# Patient Record
Sex: FEMALE | Race: WHITE
[De-identification: names, ages, dates, MRNs, and addresses within clinical notes are randomized per-mention and may not be internally consistent; named-entity substitution may affect disease eponyms.]

---

## 2017-05-15 NOTE — PHYS DOC
Past Medical History


Past Medical History:  No Pertinent History, Anxiety


Past Surgical History:  


Alcohol Use:  None


Drug Use:  None





Adult General


Chief Complaint


Chief Complaint:  HAND PROBLEM





HPI


HPI





Patient is a 32  year old female who presents here today secondary to pain to 

her left hand. Patient reports that it was actually hit her son while he was 

swinging a plastic bat. Patient claims of pain over her fifth MCP joint region. 

Patient with any other complaints at this time.





Patient's physical exam is significant for tenderness to palpation to her left 

fifth MCP area. Patient has a deformity. There is some soft tissue swelling in 

that area and some erythema. Patient's full range of motion intact. Patient has 

no deformity





Patient's ER workup was significant for an x-ray that was obtained that 

revealed no acute fracture.





X-ray left hand: No fracture no dislocation interpreted by Dr. Virgen.





Assessment and plan


This is a 32-year-old female who presents here today secondary to pain to her 

left hand after blunt trauma to it. Patient's x-rays are negative. Patient be 

discharged home with ibuprofen assist her with the pain.





Review of Systems


Review of Systems





Constitutional: Denies fever or chills []


Eyes: Denies change in visual acuity, redness, or eye pain []


All other review systems are negative except as documented in the history of 

present illness portion.





Current Medications


Current Medications





Current Medications








 Medications


  (Trade)  Dose


 Ordered  Sig/Luis Angel  Start Time


 Stop Time Status Last Admin


Dose Admin


 


 Ibuprofen


  (Motrin)  600 mg  1X  ONCE  5/15/17 23:30


 5/15/17 23:31   


 











Allergies


Allergies





Allergies








Coded Allergies Type Severity Reaction Last Updated Verified


 


  No Known Drug Allergies    7/22/15 No











Physical Exam


Physical Exam





Constitutional: Well developed, well nourished, no acute distress, non-toxic 

appearance. []


HENT: Normocephalic, atraumatic, bilateral external ears normal, oropharynx 

moist, no oral exudates, nose normal. []


Eyes: PERRLA, EOMI, conjunctiva normal, no discharge. [] 


Neck: Normal range of motion, no tenderness, supple, no stridor. [] 


Cardiovascular:Heart rate regular rhythm, no murmur []


Lungs & Thorax:  Bilateral breath sounds clear to auscultation []


Abdomen: Bowel sounds normal, soft, no tenderness, no masses, no pulsatile 

masses. [] 


Skin: Warm, dry, no erythema, no rash. [] 


Back: No tenderness, no CVA tenderness. [] 


Extremities: See above.


Neurologic: Alert and oriented X 3, normal motor function, normal sensory 

function, no focal deficits noted. []


Psychologic: Affect normal, judgement normal, mood normal. []





Current Patient Data


Vital Signs





 Vital Signs








  Date Time  Temp Pulse Resp B/P (MAP) Pulse Ox O2 Delivery O2 Flow Rate FiO2


 


5/15/17 22:52 98.0 86 18  98 Room Air  





 98.0       











EKG


EKG


[]





Radiology/Procedures


Radiology/Procedures


[]





Course & Med Decision Making


Course & Med Decision Making


Pertinent Labs and Imaging studies reviewed. (See chart for details)





[]





Dragon Disclaimer


Dragon Disclaimer


This electronic medical record was generated, in whole or in part, using a 

voice recognition dictation system.





Departure


Departure


Impression:  


 Primary Impression:  


 Contusion of left hand, initial encounter


Disposition:   HOME, SELF-CARE


Condition:  IMPROVED


Referrals:  


UNKNOWN PCP NAME (PCP)


Patient Instructions:  Hand Contusion


Scripts


Ibuprofen (IBUPROFEN) 600 Mg Tablet


600 MG PO PRN Q6HRS Y for PAIN, #20 TAB


   Prov: ALICE RESENDIZ MD         5/15/17











ALICE RESENDIZ MD May 15, 2017 23:21

## 2017-05-16 NOTE — RAD
Portable left hand, 3 views, 5/15/2017:



History: Hand injury. 



No fracture or dislocation is identified. The soft tissues are unremarkable.





IMPRESSION: No acute left hand abnormality is detected.

## 2017-07-03 NOTE — PHYS DOC
Past Medical History


Past Medical History:  No Pertinent History, Anxiety


Past Surgical History:  


Alcohol Use:  None


Drug Use:  None





Adult General


Chief Complaint


Chief Complaint:  KNEE INJURY





HPI


HPI





Patient is a 32  year old female with history of anxiety who presents with 

right posterior knee pain mild in nature that began yesterday while mowing his 

lawn. Patient denies any known injury. Patient states the pain is worse on 

flexion of the knee.





Review of Systems


Review of Systems





Constitutional: Denies fever or chills []


Eyes: Denies change in visual acuity, redness, or eye pain []


Musculoskeletal: right posterior knee pain


Integument: Denies rash or skin lesions []


Neurologic: Denies headache, focal weakness or sensory changes []


Endocrine: Denies polyuria or polydipsia []





Allergies


Allergies





Allergies








Coded Allergies Type Severity Reaction Last Updated Verified


 


  No Known Drug Allergies    7/22/15 No











Physical Exam


Physical Exam





Constitutional: Well developed, well nourished, no acute distress, non-toxic 

appearance. []


HENT: Normocephalic, atraumatic, bilateral external ears normal, oropharynx 

moist, no oral exudates, nose normal. []


Skin: Warm, dry, no erythema, no rash. [] 


Back: No tenderness, no CVA tenderness. [] 


Extremities: Right knee with no obvious edema and obvious ecchymosis no obvious 

deformity. Slight tenderness on palpation of posterior right knee. Full range 

of motion to the right knee including negative Lachman sign and negative 

Sandra's sign negative anterior-posterior drawer sign to the right knee. +2 

right pedal pulse. Cap refill less than 2 seconds the right lower extremity. 

Sensation intact to the right lower extremity.


Neurologic: Alert and oriented X 3, normal motor function, normal sensory 

function, no focal deficits noted. []


Psychologic: Affect normal, judgement normal, mood normal. []





Current Patient Data


Vital Signs





 Vital Signs








  Date Time  Temp Pulse Resp B/P (MAP) Pulse Ox O2 Delivery O2 Flow Rate FiO2


 


7/3/17 15:28 98.4 75 18  98 Room Air  





 98.4       











EKG


EKG


[]





Radiology/Procedures


Radiology/Procedures


[]PROCEDURE: KNEE RIGHT 4V





Indication injury, pain.





AP oblique and lateral views of the right knee were obtained. A sunrise view


was also obtained.





No acute finding is seen. Significant degenerative changes are not apparent














DICTATED and SIGNED BY:     EDWINA CARLSON MD


DATE:     17 5008





CC: DELANEY RENNER; NON,STAFF; UNKNOWN PCP NAME ~





Course & Med Decision Making


Course & Med Decision Making


Pertinent Labs and Imaging studies reviewed. (See chart for details)





Patient is in the ED right knee pain that began while mowing yesterday. Right 

knee x-rays interpreted by radiologist are negative for any acute findings. Ace 

wrap was applied to patient's right knee by the ED RN, neurovascular exam done 

by me is normal, cap refill less than 2 seconds. Ice elevation encouraged. 

Discharged with diclofenac. Follow-up with orthopedic doctor in a week if pain 

continues.





Dragon Disclaimer


Dragon Disclaimer


This electronic medical record was generated, in whole or in part, using a 

voice recognition dictation system.





Departure


Departure


Impression:  


 Primary Impression:  


 Knee sprain


Disposition:   HOME, SELF-CARE


Condition:  STABLE


Referrals:  


UNKNOWN PCP NAME (PCP)








AVILA MARTINEZ II, MD


Follow-up with the provided orthopedic doctor in the next 7 days


Patient Instructions:  Knee Pain, Knee Sprain





Additional Instructions:  


You were seen for right knee sprain. Wear the Ace wrap as tolerated and needed. 

Ice and elevate the extremity. Follow-up with the provided orthopedic doctor or 

your own doctor in 1-2 weeks if pain continues. Take the prescribed medicines 

as needed.


Scripts


Diclofenac Sodium (DICLOFENAC SODIUM) 50 Mg Tablet.


1 TAB PO BID, #60 TAB 1 Refill


   Prov: DELANEY RENNER         7/3/17





Problem Qualifiers








 Primary Impression:  


 Knee sprain


 Encounter type:  initial encounter  Involved ligament of knee:  unspecified 

ligament  Laterality:  right  Qualified Codes:  S83.91XA - Sprain of 

unspecified site of right knee, initial encounter








DELANEY RENNER Jul 3, 2017 16:24

## 2017-07-03 NOTE — RAD
Indication injury, pain.



AP oblique and lateral views of the right knee were obtained. A sunrise view

was also obtained.



No acute finding is seen. Significant degenerative changes are not apparent

## 2017-09-07 NOTE — RAD
Indication abdominal pain.



Axial images through the abdomen and pelvis were obtained. Approximately 75 cc

of Omnipaque 300 was administered intravenously. No oral contrast was

administered. No prior CT imaging of the abdomen or pelvis is available.



The lung bases are clear. The liver and spleen appear unremarkable. The

gallbladder appears grossly normal. The pancreas adrenal glands and kidneys

appear normal. No acute finding is apparent in the abdomen. In the pelvis no

focal mass or inflammatory process is seen. IUD is noted.



IMPRESSION: No acute finding seen in the abdomen or pelvis

## 2017-09-07 NOTE — PHYS DOC
Past Medical History


Past Medical History:  No Pertinent History, Anxiety


Past Surgical History:  


Alcohol Use:  None


Drug Use:  None





Adult General


Chief Complaint


Chief Complaint:  ABDOMINAL PAIN





HPI


HPI


32-year-old female presenting to the emergency department today with epigastric 

abdominal pain nausea with vomiting. She describes her vomitus is stomach 

contents. She denies any recent food intake and denies any other sick contacts. 

She also has had loose stools without any blood in her stools. She denies 

fevers or chills. She describes the pain is mild nonradiating intermittent and 

without alleviating factors.





Review of systems is negative for chest pain shortness of breath hematuria 

polyuria dysuria or being pregnant. All other review of systems is negative 

unless otherwise noted in history of present illness.





ED course: 32-year-old female presenting to the emergency Department 

generalized epigastric abdominal pain. Vital signs show the patient to be 

tachycardic otherwise afebrile. Pertinent physical examination findings:Soft 

nontender abdomen without rebound tenderness or guarding present. Negative 

McBurneys point. Negative Connolly sign. No ecchymosis present. IV fluids and 

Zofran given in the emergency department. CT the abdomen pelvis obtained. 

Repeat abdominal exam continues show soft and nontender abdomen. She was 

feeling much better on reexamination.  CT abd pelv neg. The patient was then 

discharged home in stable condition to follow up with their primary care 

physician over the next 2-3 days. They were to return if their symptoms 

worsened or if they were concerned for any reason. Face-to-face discharge 

instructions and return precautions were given. Patient's questions were 

answered to their satisfaction. Patient is comfortable plan.





Review of Systems


Review of Systems


SEE ABOVE.





Current Medications


Current Medications





Current Medications








 Medications


  (Trade)  Dose


 Ordered  Sig/Luis Angel  Start Time


 Stop Time Status Last Admin


Dose Admin


 


 Info


  (Do NOT chart on


 this entry -- for


 MONITORING)  1 each  PRN DAILY  PRN  17 09:45


 17 12:23 DC  


 


 


 Iohexol


  (Omnipaque 300


 Mg/ml)  75 ml  STK-MED ONCE  17 09:48


 17 09:49 DC  


 


 


 Ondansetron HCl


  (Zofran)  4 mg  1X  ONCE  17 09:00


 17 09:01 DC 17 09:09


4 MG


 


 Sodium Chloride  1,000 ml @ 


 1,000 mls/hr  1X  ONCE  17 09:30


 17 10:29 DC 17 10:10


1,000 MLS/HR











Allergies


Allergies





Allergies








Coded Allergies Type Severity Reaction Last Updated Verified


 


  No Known Drug Allergies    7/22/15 No











Physical Exam


Physical Exam


SEE ABOVE





Constitutional: Well developed, well nourished, no acute distress, non-toxic 

appearance. []


HENT: Normocephalic, atraumatic, bilateral external ears normal, oropharynx 

moist, no oral exudates, nose normal. []


Eyes: PERRLA, EOMI, conjunctiva normal, no discharge. [] 


Neck: Normal range of motion, no tenderness, supple, no stridor. [] 


Cardiovascular:Heart rate regular rhythm, no murmur []


Lungs & Thorax:  Bilateral breath sounds clear to auscultation []


Abdomen: Bowel sounds normal, soft, no tenderness, no masses, no pulsatile 

masses. [] 


Skin: Warm, dry, no erythema, no rash. [] 


Back: No tenderness, no CVA tenderness. [] 


Extremities: No tenderness, no cyanosis, no clubbing, ROM intact, no edema. [] 


Neurologic: Alert and oriented X 3, normal motor function, normal sensory 

function, no focal deficits noted. []


Psychologic: Affect normal, judgement normal, mood normal. []





Current Patient Data


Vital Signs





 Vital Signs








  Date Time  Temp Pulse Resp B/P (MAP) Pulse Ox O2 Delivery O2 Flow Rate FiO2


 


17 12:01  118 19 121/79 (93) 99 Room Air  


 


17 08:33 98.1       





 98.1       








Lab Values





 Laboratory Tests








Test


  17


07:28 17


08:19 17


09:00


 


POC Urine HCG, Qualitative


  Hcg negative


(Negative) 


  


 


 


Urine Collection Type  Unknown   


 


Urine Color  Yellow   


 


Urine Clarity  Clear   


 


Urine pH  6.5   


 


Urine Specific Gravity  >=1.030   


 


Urine Protein


  


  Negative mg/dL


(NEG-TRACE) 


 


 


Urine Glucose (UA)


  


  Negative mg/dL


(NEG) 


 


 


Urine Ketones (Stick)


  


  Negative mg/dL


(NEG) 


 


 


Urine Blood


  


  Negative (NEG)


  


 


 


Urine Nitrite


  


  Negative (NEG)


  


 


 


Urine Bilirubin


  


  Negative (NEG)


  


 


 


Urine Urobilinogen Dipstick


  


  1.0 mg/dL (0.2


mg/dL) 


 


 


Urine Leukocyte Esterase


  


  Negative (NEG)


  


 


 


Urine RBC  0 /HPF (0-2)   


 


Urine WBC


  


  Occ /HPF (0-4)


  


 


 


Urine Squamous Epithelial


Cells 


  Mod /LPF  


  


 


 


Urine Bacteria


  


  Few /HPF


(0-FEW) 


 


 


Urine Mucus  Mod /LPF   


 


White Blood Count


  


  


  14.9 x10^3/uL


(4.0-11.0)  H


 


Red Blood Count


  


  


  5.11 x10^6/uL


(3.50-5.40)


 


Hemoglobin


  


  


  15.1 g/dL


(12.0-15.5)


 


Hematocrit


  


  


  44.3 %


(36.0-47.0)


 


Mean Corpuscular Volume


  


  


  87 fL ()


 


 


Mean Corpuscular Hemoglobin   30 pg (25-35)  


 


Mean Corpuscular Hemoglobin


Concent 


  


  34 g/dL


(31-37)


 


Red Cell Distribution Width


  


  


  13.2 %


(11.5-14.5)


 


Platelet Count


  


  


  321 x10^3/uL


(140-400)


 


Neutrophils (%) (Auto)   94 % (31-73)  H


 


Lymphocytes (%) (Auto)   3 % (24-48)  L


 


Monocytes (%) (Auto)   3 % (0-9)  


 


Eosinophils (%) (Auto)   0 % (0-3)  


 


Basophils (%) (Auto)   0 % (0-3)  


 


Neutrophils # (Auto)


  


  


  14.0 x10^3uL


(1.8-7.7)  H


 


Lymphocytes # (Auto)


  


  


  0.5 x10^3/uL


(1.0-4.8)  L


 


Monocytes # (Auto)


  


  


  0.4 x10^3/uL


(0.0-1.1)


 


Eosinophils # (Auto)


  


  


  0.0 x10^3/uL


(0.0-0.7)


 


Basophils # (Auto)


  


  


  0.0 x10^3/uL


(0.0-0.2)


 


Segmented Neutrophils %   94 % (35-66)  H


 


Lymphocytes %   2 % (24-48)  L


 


Monocytes %   4 % (0-10)  


 


Platelet Estimate


  


  


  Adequate


(ADEQUATE)


 


Sodium Level


  


  


  140 mmol/L


(136-145)


 


Potassium Level


  


  


  3.6 mmol/L


(3.5-5.1)


 


Chloride Level


  


  


  100 mmol/L


()


 


Carbon Dioxide Level


  


  


  28 mmol/L


(21-32)


 


Anion Gap   12 (6-14)  


 


Blood Urea Nitrogen


  


  


  19 mg/dL


(7-20)


 


Creatinine


  


  


  0.7 mg/dL


(0.6-1.0)


 


Estimated GFR


(Cockcroft-Gault) 


  


  97.6  


 


 


BUN/Creatinine Ratio   27 (6-20)  H


 


Glucose Level


  


  


  116 mg/dL


(70-99)  H


 


Calcium Level


  


  


  9.4 mg/dL


(8.5-10.1)


 


Total Bilirubin


  


  


  0.3 mg/dL


(0.2-1.0)


 


Aspartate Amino Transferase


(AST) 


  


  16 U/L (15-37)


 


 


Alanine Aminotransferase (ALT)


  


  


  25 U/L (14-59)


 


 


Alkaline Phosphatase


  


  


  63 U/L


()


 


Total Protein


  


  


  8.5 g/dL


(6.4-8.2)  H


 


Albumin


  


  


  4.5 g/dL


(3.4-5.0)


 


Albumin/Globulin Ratio   1.1 (1.0-1.7)  


 


Lipase


  


  


  91 U/L


()





 Laboratory Tests


17 09:00








 Laboratory Tests


17 09:00














EKG


EKG


[]





Radiology/Procedures


Radiology/Procedures


[]





Course & Med Decision Making


Course & Med Decision Making


Pertinent Labs and Imaging studies reviewed. (See chart for details)





[]





Dragon Disclaimer


Dragon Disclaimer


This electronic medical record was generated, in whole or in part, using a 

voice recognition dictation system.





Departure


Departure


Impression:  


 Primary Impression:  


 Abdominal pain


 Additional Impression:  


 Nausea vomiting and diarrhea


Disposition:   HOME, SELF-CARE


Condition:  IMPROVED


Referrals:  


UNKNOWN PCP NAME (PCP)








PILI MAHAJAN MD


Patient Instructions:  Nausea and Vomiting





Additional Instructions:  


Thank you for allowing us to participate in your care today.





Followup with your primary care physician in 3 days if your symptoms do not 

improve.  Call your Primary Doctor tomorrow and inform them of your visit 

today.  If you do not have a primary care provider you can ask for a list of 

our primary care providers. Return to the emergency department you have any new 

or concerning findings.





This should be evaluated by the primary care physician and any necessary 

consulting services for continued management within a few days after discharge. 

Return to emergency room if you have any  new or concerning symptoms including 

but not limited to fever, chills, nausea, vomiting, intractable pain, any new 

rashes, chest pain, shortness of air, uncontrolled bleeding, difficulty 

breathing, and/or vision loss.


Scripts


Ondansetron (ZOFRAN ODT) 4 Mg Tab.rapdis


1 TAB SL PRN Q8HRS Y for NAUSEA, #6 TAB


   Prov: AP GOLD MD         17





Problem Qualifiers











AP GOLD MD Sep 7, 2017 08:58

## 2017-10-12 NOTE — PHYS DOC
Past Medical History


Past Medical History:  No Pertinent History, Anxiety


Past Surgical History:  


Alcohol Use:  None


Drug Use:  None





Adult General


Chief Complaint


Chief Complaint:  OTHER COMPLAINTS





[facility]


HPI





Patient is a 32  year old female presents to the emergency department with c/o 

pain to the left heel patient states this has been going on for last 3-4 days. 

Patient states she has swelling and discomfort. Patient denies injury or 

trauma. Denies pain relief with Tylenol. Patient states she tried insoles to 

see if this would help with the pain and actually made it worse.





Review of Systems


Review of Systems





Constitutional: Denies fever or chills []


Eyes: Denies change in visual acuity, redness, or eye pain []


HENT: Denies nasal congestion or sore throat []


Respiratory: Denies cough or shortness of breath []


Cardiovascular: No additional information not addressed in HPI []


GI: Denies abdominal pain, nausea, vomiting, bloody stools or diarrhea []


: Denies dysuria or hematuria []


Musculoskeletal: Denies back pain or joint pain. Left heel pain


Integument: Denies rash or skin lesions []


Neurologic: Denies headache, focal weakness or sensory changes []


Endocrine: Denies polyuria or polydipsia []





Current Medications


Current Medications





Current Medications








 Medications


  (Trade)  Dose


 Ordered  Sig/Luis Angel  Start Time


 Stop Time Status Last Admin


Dose Admin


 


 Ibuprofen


  (Motrin)  800 mg  1X  ONCE  10/12/17 08:45


 10/12/17 08:46 DC 10/12/17 08:47


800 MG











Allergies


Allergies





Allergies








Coded Allergies Type Severity Reaction Last Updated Verified


 


  No Known Drug Allergies    7/22/15 No











Physical Exam


Physical Exam





Constitutional: Well developed, well nourished, no acute distress, non-toxic 

appearance. []


HENT: Normocephalic, atraumatic, bilateral external ears normal, oropharynx 

moist, no oral exudates, nose normal. []


Eyes: PERRLA, EOMI, conjunctiva normal, no discharge. [] 


Neck: Normal range of motion, no tenderness, supple, no stridor. [] 


Cardiovascular:Heart rate regular rhythm, no murmur []


Lungs & Thorax:  Bilateral breath sounds clear to auscultation []


Abdomen: Bowel sounds normal, soft, no tenderness, no masses, no pulsatile 

masses. [] 


Skin: Warm, dry, no erythema, no rash. [] 


Extremities: Left heel tenderness and swelling, no cyanosis, no clubbing, ROM 

intact, no edema. no redness noted. Patient with full ROM of the foot. 

Peripheral pulses 2+ cap refill brisk < 2 seconds.


Neurologic: Alert and oriented X 3, normal motor function, normal sensory 

function, no focal deficits noted. []


Psychologic: Affect normal, judgement normal, mood normal. []





Current Patient Data


Vital Signs





 Vital Signs








  Date Time  Temp Pulse Resp B/P (MAP) Pulse Ox O2 Delivery O2 Flow Rate FiO2


 


10/12/17 08:28 98.5 106 16  98 Room Air  





 98.5       











EKG


EKG


[]





Radiology/Procedures


Radiology/Procedures


[][facility]


 [address] [place] [address]


 [phone]


 


 IMAGING REPORT





 Signed





PATIENT: REJI BAKER ACCOUNT: [id number] MRN#: [id number]


: 1985 LOCATION: ER AGE: 32


SEX: F EXAM DT: 10/12/17 ACCESSION#: [id number]


STATUS: REG ER ORD. PHYSICIAN: ERICA MEDRANO 


REASON: heel pain for 3-4 days increase pain with ambulation


PROCEDURE: FOOT LEFT 3V





Left foot, 3 views, 10/12/2017:





History: Heel pain





No acute fracture or dislocation is identified. There is smooth cortical


thickening along the shaft of the fourth metatarsal compatible with an old


healed fracture. There is a small sclerotic focus in the proximal end of the


proximal phalanx of the third toe compatible with a bone island. There is a


similar small sclerotic focus in the cuboid bone laterally. A small inferior


calcaneal spur is noted. There is mild subcutaneous edema along the plantar


surface of the foot and heel.





IMPRESSION:


1. Small inferior calcaneal spur.


2. No acute bony abnormality is detected.














DICTATED and SIGNED BY:     MORITZ,RICK S MD


DATE:     10/12/17 0851





CC: ERICA MEDRANO; UNKNOWN PCP NAME ~





Course & Med Decision Making


Course & Med Decision Making


Pertinent Labs and Imaging studies reviewed. (See chart for details)


X-ray negative for fractures. Patinet will be recommended to use NSAID's for 

pain with ice packs on 20 minutes and 20 minutes off. Elevation as much as 

possible. Recommended that patient use a tennis ball to stretch out the plantar 

fascitis. Patient will also be recommended to follow up with podiatrist in the 

next week. Signs and symptoms to return to the emergency department has been 

provided. All questions and concerns have been answered at the patients 

bedside. Patient agrees with discharge instructions, treatment and regimens.


[]





Dragon Disclaimer


Dragon Disclaimer


This electronic medical record was generated, in whole or in part, using a 

voice recognition dictation system.





Departure


Departure


Impression:  


 Primary Impression:  


 Left foot pain


Disposition:   HOME, SELF-CARE


Condition:  STABLE


Referrals:  


UNKNOWN PCP NAME (PCP)


Patient Instructions:  Plantar Fasciitis (Heel Spur Syndrome) with Rehab-

[facility]





Additional Instructions:  


Activity as tolerated


Medications as prescribed


Ice packs on 20 minutes and off 20 minutes several times a day


Elevation as much as possible


Use a tennis ball to roll on the bottom of the foot to help stretch out the 

plantar fascitis 


Followup with podiatrist in 1 week


Return to emergency department as needed for signs and symptoms that become 

worse


Scripts


Ibuprofen (IBUPROFEN) 800 Mg Tablet


800 MG PO PRN Q6HRS Y for INFLAMMATION, #30 TAB


   Prov: ERICA MEDRANO         10/12/17











ERICA MEDRANO Oct 12, 2017 08:46

## 2017-10-12 NOTE — RAD
Left foot, 3 views, 10/12/2017:



History: Heel pain



No acute fracture or dislocation is identified. There is smooth cortical

thickening along the shaft of the fourth metatarsal compatible with an old

healed fracture. There is a small sclerotic focus in the proximal end of the

proximal phalanx of the third toe compatible with a bone island. There is a

similar small sclerotic focus in the cuboid bone laterally. A small inferior

calcaneal spur is noted. There is mild subcutaneous edema along the plantar

surface of the foot and heel.



IMPRESSION:

1. Small inferior calcaneal spur.

2. No acute bony abnormality is detected.

## 2019-03-09 ENCOUNTER — HOSPITAL ENCOUNTER (EMERGENCY)
Dept: HOSPITAL 61 - ER | Age: 34
Discharge: HOME | End: 2019-03-09
Payer: COMMERCIAL

## 2019-03-09 VITALS — WEIGHT: 165 LBS | BODY MASS INDEX: 29.23 KG/M2 | HEIGHT: 63 IN

## 2019-03-09 VITALS — DIASTOLIC BLOOD PRESSURE: 75 MMHG | SYSTOLIC BLOOD PRESSURE: 142 MMHG

## 2019-03-09 DIAGNOSIS — J45.909: Primary | ICD-10-CM

## 2019-03-09 PROCEDURE — 99283 EMERGENCY DEPT VISIT LOW MDM: CPT

## 2019-03-09 NOTE — PHYS DOC
Past Medical History


Past Medical History:  Anxiety, Asthma


Past Surgical History:  


Alcohol Use:  Rarely


Drug Use:  None





Adult General


Chief Complaint


Chief Complaint:  COUGH





HPI


HPI





Patient is a 33  year old female who presents with cough and chest congestion 

for the past 14 hours. She reports a nonproductive, dry, painful cough that has 

progressively worsened throughout the day. Her pain is localized substernally 

and rates it a 6/10, and it can go up to an 8/10 when she coughs only with 

coughing.. Pt has a history of asthma treated w/ albuterol PRN, though she 

notes she has not used her inhaler for "quite some time" and needs to get a 

refill. She admits to ELDRIDGE and an inability to take a deep breath without 

inducing a painful cough. She denies fever/chills, chiest pain, n/v, diarrhea, 

constipation, dysuria, and urgency. She reports being up to date on all of her 

vaccinations and denies recent travel, sick contacts, and environmental changes.





Review of Systems


Review of Systems





Constitutional: Denies fever or chills 


Eyes: Denies change in visual acuity, redness, or eye pain 


HENT: Denies nasal congestion or sore throat 


Respiratory: Admits to cough, ELDRIDGE, and bronchial pain not unlike symptoms she's 

experienced from her asthma. shortness of breath 


Cardiovascular: No additional information not addressed in HPI 


GI: Denies abdominal pain, nausea, vomiting, bloody stools or diarrhea 


: Denies dysuria or hematuria 


Musculoskeletal: Denies back pain or joint pain 


Integument: Denies rash or skin lesions 


Neurologic: Denies headache, focal weakness or sensory changes 


Endocrine: Denies polyuria or polydipsia 





All other systems were reviewed and found to be within normal limits, except as 

documented in this note.





Current Medications


Current Medications





Current Medications








 Medications


  (Trade)  Dose


 Ordered  Sig/Luis Angel  Start Time


 Stop Time Status Last Admin


Dose Admin


 


 Prednisone


  (Prednisone)  50 mg  1X  ONCE  3/9/19 04:15


 3/9/19 04:16 DC 3/9/19 04:21


50 MG











Allergies


Allergies





Allergies








Coded Allergies Type Severity Reaction Last Updated Verified


 


  No Known Drug Allergies    7/22/15 No











Physical Exam


Physical Exam





Constitutional: Well developed, well nourished, no acute distress, non-toxic 

appearance. []


HENT: Normocephalic, atraumatic, bilateral external ears normal, oropharynx 

moist but mildly inflamed and erythematous without exudate, nose normal. []


Eyes: PERRLA, EOMI, conjunctiva normal, no discharge. [] 


Neck: Normal range of motion, no tenderness, supple, no stridor. [] 


Cardiovascular:Heart rate regular rhythm, no murmur []


Lungs & Thorax:  Bilateral breath sounds clear to auscultation, able to move 

air adequately but unable to take a full, deep breath.


Abdomen: Bowel sounds normal, soft, no tenderness, no masses, no pulsatile 

masses. [] 


Skin: Warm, dry, no erythema, no rash. [] 


Back: No tenderness, no CVA tenderness. [] 


Extremities: No tenderness, no cyanosis, no clubbing, ROM intact, no edema. [] 


Neurologic: Alert and oriented X 3, normal motor function, normal sensory 

function, no focal deficits noted. []


Psychologic: Affect normal, judgement normal, mood normal. []





Current Patient Data


Vital Signs





 Vital Signs








  Date Time  Temp Pulse Resp B/P (MAP) Pulse Ox O2 Delivery O2 Flow Rate FiO2


 


3/9/19 03:12 98.4 101 18 142/75 (97) 98 Room Air  





 98.4       











EKG


EKG


[]





Radiology/Procedures


Radiology/Procedures


[]





Course & Med Decision Making


Course & Med Decision Making


33 year old afebrile female with a history of asthma and seasonal allergies 

presents with a nonproductive dry and hacking cough. Pain localized 

substernally and increases with deep respiration. She reports similar symptoms 

that she has experienced in the past with asthma.





Ddx:


Acute bronchitis


Viral Upper respiratory infection


Asthma 


GERD





pt has essentially clear lungs


has some frequent reactive cough that reproduces her symptoms


trial prednisone albuterol. i dont think abx are necessary i todl her this, we 

ultimately agreed take albuterol and prednisone, if not improved in five days 

consider ab at that time.





Dragon Disclaimer


Dragon Disclaimer


This electronic medical record was generated, in whole or in part, using a 

voice recognition dictation system.





Departure


Departure


Impression:  


 Primary Impression:  


 Bronchitis


Disposition:  01 HOME, SELF-CARE


Condition:  STABLE


Referrals:  


UNKNOWN PCP NAME (PCP)


Scripts


Doxycycline Hyclate (DOXYCYCLINE HYCLATE) 100 Mg Capsule


1 CAP PO BID, #14 CAP


   Prov: DANGELO WALLACE MD         3/9/19 


Albuterol Sulfate (PROAIR HFA INHALER) 8.5 Gm Hfa.aer.ad


1 PUFF INH PRN Q6HRS PRN for SHORTNESS OF BREATH, #1 INHALER 0 Refills


   Prov: DANGELO WALLACE MD         3/9/19 


Prednisone (PREDNISONE) 50 Mg Tablet


1 TAB PO DAILY, #5 TAB


   Prov: DANGELO WALLACE MD         3/9/19











DANGELO WALLACE MD Mar 9, 2019 04:10